# Patient Record
(demographics unavailable — no encounter records)

---

## 2024-10-17 NOTE — ASSESSMENT
[FreeTextEntry1] : 8 yo male with episodes of croup and cough, the latter with URIs. 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  I had discussed  the unified airway theory - has crouo and dx of asthma, mild intermittent.  No family history of asthma except in a maternal cousin. No concerns at this time re. sleep disorder breathing, persistent rhinitis or TE as confounders.  He has been on intermittent regimen of budesonide for URI-triggered cough/wheeze and with good response in addition to albuterol. Mom understands that frequent use of regimen will inform the decision re. need for daily use. He is well between episodes.  Mom prefers the nebulized route for medications as Chintan is autistic and does not do well with facial interfaces such as mask with the spacer.  He has prednisolone on standby as has been the practice and mom's request.  REcommendations: 1. Nebulized saline 2-3 times a day for nose and chest congestion. Also discussed mist,hot shower for croup. 2. Nebulized albuterol every 4 hours for cough or wheeze as needed. 3. When sick with a cold and with cough or wheeze, start nebulized budesonide 0.5 mg twice daily for about a week or until better then stop. We will keep track of use. 4. If with persistent stridor, barky cough and/or cough not responding to albuterol every 4 hours and going into 24 - 48 hours, start prednisolone 15 mg/5 ml, 7.5 ml BID x 5 days. 5. Follow up in the winter.

## 2024-10-17 NOTE — ASSESSMENT
[FreeTextEntry1] : 6 yo male with episodes of croup and cough, the latter with URIs. 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  I had discussed  the unified airway theory - has crouo and dx of asthma, mild intermittent.  No family history of asthma except in a maternal cousin. No concerns at this time re. sleep disorder breathing, persistent rhinitis or TE as confounders.  He has been on intermittent regimen of budesonide for URI-triggered cough/wheeze and with good response in addition to albuterol. Mom understands that frequent use of regimen will inform the decision re. need for daily use. He is well between episodes.  Mom prefers the nebulized route for medications as Chintan is autistic and does not do well with facial interfaces such as mask with the spacer.  He has prednisolone on standby as has been the practice and mom's request.  REcommendations: 1. Nebulized saline 2-3 times a day for nose and chest congestion. Also discussed mist,hot shower for croup. 2. Nebulized albuterol every 4 hours for cough or wheeze as needed. 3. When sick with a cold and with cough or wheeze, start nebulized budesonide 0.5 mg twice daily for about a week or until better then stop. We will keep track of use. 4. If with persistent stridor, barky cough and/or cough not responding to albuterol every 4 hours and going into 24 - 48 hours, start prednisolone 15 mg/5 ml, 7.5 ml BID x 5 days. 5. Follow up in the winter.

## 2024-10-17 NOTE — PHYSICAL EXAM
[Well Nourished] : well nourished [Well Developed] : well developed [Alert] : ~L alert [Active] : active [Normal Breathing Pattern] : normal breathing pattern [No Respiratory Distress] : no respiratory distress [No Allergic Shiners] : no allergic shiners [No Drainage] : no drainage [No Conjunctivitis] : no conjunctivitis [Tympanic Membranes Clear] : tympanic membranes were clear [Nasal Mucosa Non-Edematous] : nasal mucosa non-edematous [No Nasal Drainage] : no nasal drainage [No Oral Pallor] : no oral pallor [No Oral Cyanosis] : no oral cyanosis [Non-Erythematous] : non-erythematous [No Exudates] : no exudates [No Postnasal Drip] : no postnasal drip [No Tonsillar Enlargement] : no tonsillar enlargement [Absence Of Retractions] : absence of retractions [Symmetric] : symmetric [Good Expansion] : good expansion [No Acc Muscle Use] : no accessory muscle use [Good aeration to bases] : good aeration to bases [Equal Breath Sounds] : equal breath sounds bilaterally [No Crackles] : no crackles [No Rhonchi] : no rhonchi [No Wheezing] : no wheezing [Normal Sinus Rhythm] : normal sinus rhythm [No Heart Murmur] : no heart murmur [Soft, Non-Tender] : soft, non-tender [No Hepatosplenomegaly] : no hepatosplenomegaly [Non Distended] : was not ~L distended [Abdomen Mass (___ Cm)] : no abdominal mass palpated [Full ROM] : full range of motion [No Clubbing] : no clubbing [Capillary Refill < 2 secs] : capillary refill less than two seconds [No Cyanosis] : no cyanosis [No Petechiae] : no petechiae [No Kyphoscoliosis] : no kyphoscoliosis [No Contractures] : no contractures [Alert and  Oriented] : alert and oriented [No Abnormal Focal Findings] : no abnormal focal findings [Normal Muscle Tone And Reflexes] : normal muscle tone and reflexes [No Rashes] : no rashes [No Skin Lesions] : no skin lesions [FreeTextEntry1] : cooperative with exam

## 2024-10-17 NOTE — HISTORY OF PRESENT ILLNESS
[FreeTextEntry1] : INterval history: Nebulized medications not used at all since last visit.  He still prefers use of nebulizer over MDI/spacer. No snoring. No concerns re. nasal allergies.   last seen 2/22/24: 7 yo male with episodes of croup and cough, the latter with URIs. 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  I had discussed pre-school wheeze/cough and croup - within the perspective of upper (croup) airway reactivity and lower airway reactivity - the unified airway theory. His trajectory needs to be followed re. evolution towards an asthma diagnosis given risk factor of underlying allergy. No family history of asthma except in a maternal cousin. No concerns at this time re. sleep disorder breathing, persistent rhinitis or TE as confounders.  He has been on intermittent regimen of budesonide for URI-triggered cough/wheeze and with good response in addition to albuterol. He is well between episodes. He has had used the regimen twice since the last visit with good outcome and mom admits that she may "have prematurely provided prednisolone". Mom prefers the nebulized route for medications as Chintan is autistic and does not do well with facial interfaces such as mask with the spacer but willing to try to acclimatize him so to speak to using the spacer.. Lungs were clear to auscultation at today's visit.  I reviewed the nebulized medications and use of prednisolone, triggers of symptoms and demonstrated use of the aerochamber.  REcommendations: 1. Nebulized saline 2-3 times a day for nose and chest congestion. Also discussed mist,hot shower for croup. 2. Nebulized albuterol every 4 hours for cough or wheeze as needed. 3. When sick with a cold and with cough or wheeze, start nebulized budesonide 0.5 mg twice daily for about a week or until better then stop. We will keep track of use. 4. If with persistent stridor, barky cough and/or cough not responding to albuterol every 4 hours and going into 24 - 48 hours, start prednisolone 15 mg/5 ml, 7.5 ml BID x 5 days. 5. Follow up in the spring 2024.

## 2024-11-22 NOTE — DISCUSSION/SUMMARY
[FreeTextEntry8] : BEHAVIORAL OBSERVATIONS  -	Appearance: within normal limits -	Gross motor: within normal limits -	Fine motor: right handed. Pencil  is appropriate for age. -	Visual: within normal limits -	Hearing: within normal limits -	Receptive language: needs instructions repeated and rephrased into simpler language.  -	Expressive language: fluent with appropriate rate, volume. Prosody of speech somewhat atypical. Frequently returns to preferred topics in conversation. -	Social relatedness: some reciprocal conversation. Initiated and asked examiner questions. Some hesitation  from mom throughout the day, but benefited from breaks. -	Sensory processing: various repetitive behaviors (hand flapping and clapping, vocalizations/throat clearing, facial grimacing, some ear flicking).  -	Cooperation/effort/transitions: transitioning after breaks was difficult. Had difficulty with task persistence and benefitted from frequent breaks. Engaged in using a visual schedule to track progress, although this was at times a distractor. Asked for reassurance about upcoming activities. -	Attention: short attention span, often distractible (internally and externally). Able to be redirected. Benefitted from examiner ensuring his eye contact and attention before giving instructions.  -	Speed of performance: quick to respond. -	Mood/affect: affect is wide ranging. Somewhat tearful when he missed his mother.  -	Thought process: perseverates on ideas and topics  Overall, Chintan appeared to put forth good effort on all tasks, and results of this evaluation are considered a valid representation of Chintan's current neuropsychological status.   ASSESSMENT RESULTS  The results of Chintan's performance during this assessment are summarized below. Unless otherwise specified, performance is compared to same-age peers. All test scores and evaluation procedures are provided in an appendix at the end of this report.  Chintan showed particular areas of strength (above 90th percentile) in: -	Academic skills: o	Reading skills, including word reading and word decoding o	Math skills, including math calculation o	Writing skills, including spelling   Chintan showed age-appropriate skills (25th-75th percentile) in: -	Intellectual/reasoning abilities, including  o	Verbal comprehension skills (fund of vocabulary, understanding similarities among verbal concepts) o	Nonverbal reasoning (recognizing patterns among objects, quantitative reasoning)  Chintan showed relative areas of weaknesses (<25th percentile) or variable performance in:  -	Visual motor functioning, including  o	Fine motor speed and dexterity (manipulating small objects in fingers), which was variable, with exceptionally low performance using the right hand but average using the left.  o	Visual motor integration (hand-eye coordination)  o	Visual motor construction when recreating block designs based on pictures -	Processing speed, or speed of processing information and producing a response -	Attention and behavior regulation, including difficulties with inattention, sustaining attention and inhibiting from responding to distractions. On direct measures of attention regulation and inhibition, Chintan demonstrated difficulties with impulsivity, focusing attention, and maintaining vigilance. Throughout the testing day, distractibility affected his performance on some tasks, and he required multiple teaching trials and additional modifications to reduce distractions. On standardized parent report, subclinical symptoms were endorsed, including 0 of 9 symptoms of inattention that are part of diagnostic criteria for attention-deficit/hyperactivity disorder, and 4 of 9 symptoms of hyperactivity/impulsivity.  -	Adaptive functioning: Adaptive skills, or skills Chintan demonstrates in daily life, were overall below average for his age. Parent report indicated low average communication skills, and below average daily living, socialization, and motor skills.   Social/emotional/behavioral functioning  The assessment for the presence of symptoms of an autism spectrum disorder (ASD) was done by clinical interview with parent, standardized questionnaires completed by parent, and administration of a semi-structured, standardized interactive measure, the ADOS-2 (Module 3). The combination of these procedures assess the child's functioning in the areas of reciprocal social communication interaction, and repetitive/stereotyped behavior, which are the defining behavioral features of ASD. The results of these measures are combined with informed clinical judgment of the examiner in order to determine diagnosis. -	Affect recognition abilities when distinguishing among emotions based on pictures of facial expressions was age-appropriate. -	A standard parent report of social skills indicated concerns with social communication and repetitive, restrictive behaviors.  Observations on the ADOS-2 indicated a social and behavioral profile consistent with autism spectrum disorder: -	Social Reciprocity and Social Communication o	Social/Emotional Reciprocity: Chintan engaged in back-and-forth communication with the examiner, especially in response to direct cues and prompts (question-answer format), with few instances of sustained conversation. He also showed curiosity about the examiner's experiences (e.g. asking if the examiner was a doctor, asked the examiner about her mother). At times, there were gaps in Chintan's reciprocity where he got stuck on question and would return to it later even though the examiner had answered it already. Chintan also had difficulty telling a narrative and responding to the examiner's comments. In contrast, when the topic of conversation was about a preferred topic (Blaze and the Monster Machines), he was able to verbalize much more, although the conversation largely involved discussing AJ the . This was consistent with parent report that although his social reciprocity in conversations has improved, he still perseverates on preferred topics and asks questions that he knows the answer to already. o	Nonverbal communication and understanding of his and others' emotions: Chintan demonstrated difficulties understanding and effectively using nonverbal communication, including integrating eye contact with his speech and gestures, or recognizing emotions in characters in a book or play scheme. This is consistent with parent report that he has difficulty picking up subtler emotions of others outside the family.  o	Developing, Maintaining, and Understanding Relationships: Chintan demonstrated a limited understanding of relationships and friendships. While he expressed that friendship is a good thing, he was unable to elaborate further. He also demonstrated difficulties with understanding social annoyances and describing his relationships with other children.  -	Restricted, Repetitive Behaviors, Interests, and Sensory Sensitivities o	Repetitive Behaviors: Chintan demonstrated some idiosyncratic and repetitive behaviors during the assessment. For example, hand clapping and flapping, throat clearing/vocalizations, facial grimaces, and some flicking of the ears. On multiple occasions, he flapped or clapped his hands when he was excited during some tasks, especially the academic tasks (math, reading, and spelling).  o	Restricted Interests: Chintan often brought up Blaze and the Monster Machines, even when it was not relevant to the conversation topic. He was easily redirected back on target. However, when he became upset while discussing his emotions, he became fixated on missing his mother and he had difficulty being redirected back on target.  o	Inflexible Adherence to Routines, or Rituals: During the evaluation, Chintan was perfectionistic on some tasks when he did not like how the examiner mamta a slash and corrected it himself.  o	Sensory Sensitivities: Per his mother, Chintan is a picky eater especially about food textures. He also covers his ears when it gets too loud.  SUMMARY and IMPRESSIONS Chintan Meng is a 7 year, 0-month-old male with history of autism spectrum disorder with accompanying language impairment. Chintan was referred for neuropsychological evaluation by his mother to characterize his autism spectrum disorder presentation, and his cognitive and adaptive functioning to inform intervention and education recommendations. Chintan is in the 2nd grade with an IEP that was first established in pre-school. He is in a self-contained special education classroom. He has also received CHRIS therapy, speech language therapy services 2x/week, occupational therapy services 1x/week, and small-group reading supports. Based on psychoeducational evaluation from 2020, Rafiqs overall intellectual functioning was low average. Core language skills and adaptive functioning were also below age expectations. At the time, Chintan met diagnostic criteria for autism spectrum disorder, level 2 (requiring substantial support), with accompanying language impairment. The current evaluation assessed Chintan's cognitive, social, and adaptive functioning.  Results of this evaluation are largely consistent with results of his 2020 evaluation. Chintan's cognitive and behavioral profile continues to be consistent with a DSM-5 diagnosis of autism spectrum disorder (ASD), level 2 (requiring substantial support), without intellectual impairment. Further speech language testing is recommended to determine language skills. Per his mother's report, Chintan's social functioning has improved significantly over the past few years from his engagement in CHRIS therapy. However, he will benefit from continued CHRIS services as he continues to demonstrated weaknesses in social reciprocity, understanding of others' emotions, nonverbal communication, understanding of relationships, as well as presence of repetitive behaviors, restricted/fixated interests, behavioral rigidity, and sensory sensitivities.   At this evaluation, Rafiqs academic skills were an area of exceptional strength; he demonstrates exceptionally high reading, math, and spelling skills. His level of engagement visibly improved when the evaluation transitioned to academic skills, and he took pride in that fact that he reads and does math above his current grade level. Chintan also demonstrated age-appropriate overall intellectual functioning/reasoning skills. Relative weaknesses were seen in visual motor skills and processing speed, which can make certain tasks requiring hand-eye coordination and speed more difficult. Weaknesses in adaptive functioning, particularly daily living skills that require fine motor dexterity, may reflect these visual motor and processing speed difficulties. Continued support in building adaptive functioning skills is crucial. Chintan also demonstrated variable attention and behavior regulation skills that, at this time, is best understood in the context of his autism symptoms (e.g., perseverating on certain thoughts in his mind may make him more distractible). Given parent does not endorse significant symptoms of attention-deficit/hyperactivity disorder (ADHD), he does not meet criteria for a diagnosis of ADHD at this time. His attention skills should be closely monitored as co-occurring ASD and ADHD is common. It is also possible that because of his exceptional academic skills and restricted interests, Chintan is feeling underchallenged in areas outside of his interest, which can lead to attention and behavior dysregulation. Ensuring that his academic curriculum meets his academic abilities will be important to prevent boredom in school.   Chintan's unique cognitive and behavioral profile, including being on the autism spectrum, suggests that he may attend to his world differently than non-autistic individuals, and differences in what he gravitates toward can often conflict with what is expected of them from parents/teachers/peers. For example, Chintan can attend very well to details about topics that interest him (e.g. Blaze and the Monster Machines, academics), and learn about these topics at pace and complexity that many of his peers cannot. Exceptional skills in one area, also called "splinter skills," are sometimes seen in autistic children. In contrast, he may not as readily attend to social cues and "unsaid rules" within group settings that can help guide behavior. While Chintan appears socially motivated to make friends, he has more difficulty readily recognizing others' social cues, understand peer intentions and know how to effectively engage in social interaction. Continued support to build his "toolbox" of skills to navigate the world around him, while also celebrating his unique strengths and differences will be important.   Chintan is a bright, creative, and sweet child. We are optimistic that with the appropriate supports and interventions, he will continue to develop and thrive in his home, school, and community settings.   ICD-10 Diagnoses:  Autism spectrum disorder, level 2 requiring substantial support, without intellectual impairment [FreeTextEntry4] : Therapy/service recommendations  1.	Continue Applied Behavior Analysis (CHRIS) services. 2.	Continue with outpatient speech therapy services. 3.	Share this evaluation report with therapists to guide intervention planning. 4.	Chintan would benefit from services through The Office for People with Developmental Disabilities (OPWDD) due to his autism spectrum disorder.  a.	The "Front Door" process helps you get started on learning about OPWDD services and which services Chintan may qualify for: https://opwdd.[place]/get-started/front-door.  5.	Additional resources/service  a.	Chintan may be eligible for a "Medicaid Waiver" so that he can access Home and Community Based Services (HCBS) such as occupational therapy that is state funded even if he might not otherwise qualify based on parent income.  Learning more at: https://opwdd.[place]/getting-started/medicaid-and-opwdd-services   b.	SSI: Supplemental Security Income: http://www.ssa.gov/disabilityssi/ssi.html  6.	Chintan may benefit from social skills groups in the future. Some examples of providers of social skills groups/training include: a.	Program for the Education and Enrichment of Relational Skills (PEERS) is an evidence-based social skills programs for children, adolescents, and young adults who are interested in developing and maintaining close friendships.  i.	https://[employer]ell/ offers social skills training by PEERS trained providers in [place].  b.	https://www.[employer]/  also has social skills training.  c.	You may want to review The National Standards Project: http://www.nationalautismcenter.org/national-standards-project/phase-2/ for information about effective interventions for ASD. This project is parent-driven and informed by experts across medicine to provide as much of an unbiased review of treatments and the evidence supporting their effectiveness for children.  School recommendations 1.	Chintan should continue to qualify for an Individualized Education Program (IEP).  2.	He requires specialized instruction that uses Applied Behavior Analysis (CHRIS) services provided by trained staff (BCBA or BCBA supervised). Services should include parent training. 3.	Continue with instruction in a special education classroom with a small teacher to student ratio. 4.	Continue speech therapy services to improve functional social communication skills.  a.	In addition to individual speech language therapy services, Chintan would benefit from speech therapy delivered in a small group setting or a supported play group with one or two peers to further address pragmatic language abilities and encourage age-appropriate interaction and conversation skills with peers. 5.	Increase occupational therapy services (2-3x/week).   a.	Adaptive functioning goals should be included in Chintan's IEP, with the goal to teach Chintan skills necessary for being as independent as possible in the future (e.g., meal preparation, laundry, making small purchases).  b.	Address Chintan's difficulties with sensory integration, contributing to poorly modulated activity, impulsivity, and inattention. Provide tools that help Chintan regulate himself when he is seeking sensory stimulation or being overstimulated.   c.	Teach functional play skills. Model to Chintan how to interact with a variety of toys, including make-believe play and redirect repetitive play to more functional play schemes.  6.	Provide social skills training and counseling focusing on building emotional awareness, adaptive coping skills, social problem solving, opportunities to practice social skills in a structured and supported group setting. 7.	Support for Autistic Burnout Recovery: Autistic burnout has the following primary characteristics: "chronic exhaustion, loss of skills, and reduced tolerance of stimulus". Autistic individuals across all ages experience autistic burnout due to the consistent pressures such as navigating social situations, sensory overstimulation, and ongoing masking among neurotypical peers. If your child appears more tired or irritable right after school or after events with a lot of social or sensory demands, then your child may be experiencing autistic burnout. Suggestions to support autistic burnout recovery in school and home can include: a.	Allowing Chintan to have frequent breaks or downtime when social demands are reduced. b.	Allowing Chintan to engage in his preferred interests and/or repetitive behaviors. c.	For more information about autistic burnout, please read: Antidotes to Autistic Burnout, Having All of Your Internal Resources Exhausted Beyond Measure and Being Left with No Clean-Up Crew, and Autistic Burnout, Explained  Home recommendations 1.	Extracurricular activities for children on the autism spectrum: Continue enrolling Chintan in activities that allow opportunities for socialization in a guided environment as well as helping him stay active.  a.	Adaptive martial arts program: http://www.LUMOback.org/kicking-the-spectrum/ b.	Swimming: https://[facility].org/adaptive-aquatics c.	Music therapy: http://www.myqlfrtqjfnu9upaofknf.org/music-therapy.htm d.	Additional lists: 	https://[place]cautismcommunity.org/autism-organizations-and-providers/  	https://www.OneEyeAnt.Synerscope/  	https://www.PredictAd/lists/fun-for-families-with-special-needs-around-[facility]  	https://Flocasts/family/[facility]-special-needs-guide  2.	To support behavior management: aGarett Woodson's activity level can make it difficult to be sure he is attending to verbal instruction. Adults interacting with Chnitan should obtain eye contact and provide verbal instruction using clear, simple language. Multi-step instructions should be broken into smaller steps to ensure his success.  b.	In order to increase his ability to pay attention to activities, Chintan will benefit most from a learning environment in which distractions are very low initially (for example, quiet, clean, organized) and then made increasingly complex as skills are systematically generalized to more natural conditions.    c.	Chintan would benefit from an individualized behavior and motivation system. This should include a frequent schedule of reinforcement, including primary and social reinforcers, and a behavior plan that will increase desired behavior and decrease maladaptive behavior (for example, increase attention to tasks and decrease crying when asked to complete a non-preferred activity).  Reinforcement should be given across all environments (programming, home).    d.	Chintan also engaged in self-stimulating behaviors during the evaluation. To help distract him from these behaviors, Chintan should be engaged in more interactive play and routines with his therapists and parents, once these routines have been developed. Distraction can take the form of playful exchanges during dressing, bathing, eating, and playing. For instance, if Chintan is repeatedly building and breaking down a block tower, an adult could playfully put his hand over the next toy and instruct Chintan to add in a character to the play, and pretend a monster is knocking down the block tower.    3.	Emotion Identification & Regulation: a.	Proactively monitor Chintan for signs of overload and encourage him to use a coping strategy (e.g., breathing, taking a break) before he becomes overloaded. It will also be helpful to develop a system for Chintan to self-monitor feelings/overload, such as a feelings thermometer or other visual system. Include self-advocacy and coping skills as part of this system, so that he knows what to do when he reaches different levels on the thermometer.   b.	Continue supporting Chintan in building adaptive emotion identification and regulation skills: o	Using the "Zones of Regulation" (e.g., noting when he is in the "green," "yellow," or "red" zone for when he is feeling happy/calm, frustrated, or angry, respectively) can be a helpful way for Chintan to learn how to notice his emotions and communicate to others how he is feeling.  o	Continue encouraging Chintan to use words to tell others how he is feeling. Adults can model this by regularly using emotional vocabulary throughout their day ("I'm feeling mad/sad/happy/scared/etc.) Praise Chintan for any attempts to use his words to show others how he is feeling. o	Teach Chintan simple calming strategies such as breathing for when he is feeling nervous or upset. One way to teach breathing is to have Chintan lie down on his back and put a stuffed animal/toy on his belly. Have his breathe in and move the stuffed animal up, then breathe out and bring the stuffed animal back down. 4.	Additional resources: a.	Additional resources: https://www.research.[facility]/car-autism-roadmap.    b.	Parent to Parent of [place]: Parent to Parent [facility] builds a supportive network of families to reduce isolation and empower those who care for people with developmental disabilities or special healthcare needs to navigate and influence service systems and make informed decisions. https://www.ptopnys.org/  c.	My autism book: A child's guide to their autism spectrum diagnosis by Tamia Jara and Krystal Goetz provides a first-hand account of how a parent can explain an autism diagnosis to their child in a sensitive, positive and accurate way. jericho.	[employer].org includes tips and articles about special education law and the IEP process.  Continued monitoring/Follow-up with Neuropsychology Chintan's school should continue to closely monitor his academic, social, adaptive, and emotional development. The family is welcome to return for additional neuropsychological consultation or evaluation should they have additional questions about cognitive/social development in the future.  It was a pleasure working with Chintan and his family. Please reach out with any questions at [phone] or email [email or web address] (attention Dr. Jenny Tobias).

## 2024-11-22 NOTE — HISTORY OF PRESENT ILLNESS
[FreeTextEntry1] : CURRENT CONCERNS Chintan's mother reported concerns about the following: -	Social difficulties:  o	Social reciprocity and social emotional understanding: Parent reports that his conversation skills have improved, although he needs to work on the variety of conversation. He can get stuck on certain topics at times (e.g. [employer] and ask his mother the same questions even if he knows the answer). He can join in others' positive emotions (e.g. will cheer if others are cheering at a game). However, he does not empathize with others' negative emotions (he starts laughing when others are crying). He does notice when his mother is mad. He has been learning to take turns in [employer] ([employer]). He does not respond to others' social overtures and often needs prompting from his mother. When prompted, his responses are brief.  o	Nonverbal communication: He does not  on subtle emotions from others, however he will respond to changes in tone of voice. He does not engage in joint attention. His eye contact is getting better, but still needs improvement (when he greets people, he will verbalize but not look at them). He sometimes uses gestures to accompany language. He points to things that he wants.  o	Interpersonal Relationships: He has a few friends and engages in play dates. He is socially interested and wants to be around classmates. He prefers to play alongside or near friends, but not cooperatively with them. His play is often self-directed.  -	Restricted and repetitive behaviors and interests:  o	Repetitive and self-stimulatory behaviors: He stims (claps and flaps his hands). He started grimacing his face repeatedly since the start of this school year. He also fixates on sticking his finger in his mouth, although he does not ingest objects. He repeatedly jumps. He does not sit still and is constantly moving. He has a 1:1 paraprofessional at school for hyperactivity. He repeatedly plays with the same toys. He repeats some phrases. He does not engage in scripted speech or idiosyncratic language. o	Restricted Interests: He fixates on the same toys and certain topics. Per his mother, he is playing basketball now and understanding more about team sports.  o	Rituals and Routines: He has been working actively in [employer] on being more flexible and open-minded. He can get upset with last minute changes. He notices when there are changes to routine (e.g., taking a detour route).  -	Sensory sensitivities: If it is too loud he will cover his ears. Oral sensory seeking behaviors as described above. Very picky eater. He was going to food therapy to address food aversions due to texture. Does not like certain smells of food.   RELEVANT HISTORY  Birth/Developmental:  -	Pregnancy - no complications -	Birth/delivery - born full term, no complications -	Medical history is significant for Croup syndrome and Reactive airway disease -	Developmental milestones -  o	Gross motor - he started walking at 16 months. o	Language milestones delayed. He babbled at 2 months old. He has been receiving speech therapy services since he was 3 years old. He communicates using short phrases and is able to follow simple instructions. o	He is toilet trained. -	He has been getting consistent intervention services (speech, occupational therapy/OT, physical therapy/PT, and special instruction) since he was 3 years old.  Medical:  -	Medical history is significant for Croup syndrome and Reactive airway disease -	Appetite - picky eater but has adequate nutritional intake  -	No vision, hearing, pain concerns -	Medications: AeroChamber Plus Ascencion-Vu Medium, Albuterol Sulfate (2.5 MG/3ML) 0.083% Inhalation Nebulization Solution, Budesonide 0.5 MG/2ML Inhalation Suspension, prednisoLONE 15 MG/5ML Oral Solution, Sodium Chloride 0.9 % Inhalation Nebulization Solution Emotional / Behavioral / Social:  -	Receives school counseling and [facility]RIS therapy. Social skills training has been recommended in the past.  Educational: In the 2nd grade with an Individualized Education Program (IEP). IEP was first established in pre-school. Instructed in self-contained special education classroom. He has received speech language therapy services 2x/week, OT services 1x/week, and small-group reading supports. Grades are improving. Enjoys writing. Able to complete single digit addition and subtraction.   Family: Lives at home with his mother, father, and younger brother (age 4 years). English is the primary language spoken at home. Family history is significant for postpartum depression and anxiety in the immediate family.   Prior Cognitive Testing:  -	Psychoeducational evaluation 2020, [facility] WHEAT CPSE) o	Evaluation for autism spectrum disorder was based on direct observations, review of educational records, standardized testing measures (ADOS-2 and CARS-2), and parent interview. Chintan met diagnostic criteria for autism spectrum disorder, level 2 (requiring substantial support), with accompanying language impairment. Examiners also noted the following: ?	Speech-language delays including articulation delay, poor speech intelligibility, and limited expressive vocabulary ?	Cognitive skills were in the low average range ?	Fine motor and sensory processing delays o	WPPSI-IV overall cognitive abilities were low average (SS=87). o	Core language skills on the CELF-5 were below average (SS = 73) with better receptive language than expressive skills. o	Overall adaptive functioning on the [place]-3 was below average (SS = 74), with exceptionally low socialization skills (SS=66).

## 2024-11-22 NOTE — REASON FOR VISIT
[Patient preference] : as per patient preference [Continuing, patient seen in-person within last 12 months] : Telehealth services are continuing as patient has been seen in-person within last 12 months. [Telehealth (audio & video) - Individual/Group] : This visit was provided via telehealth using real-time 2-way audio visual technology. [Medical Office: ([facility])___] : The provider was located at the medical office in [unfilled]. [Home] : The patient, [unfilled], was located at home, [unfilled], at the time of the visit. [Patient's space is appropriate for telehealth and maintains privacy/confidentiality.] : Patient's space is appropriate for telehealth and maintains privacy/confidentiality. [Participant(s) identity verified] : Participant(s) identity verified. [Verbal consent obtained from patient/other participant(s)] : Verbal consent for telehealth/telephonic services obtained from patient/other participant(s) [Feedback of results of neuropsychological evaluation] : Feedback of results of neuropsychological evaluation [Collateral without patient] : Collateral without patient [Mother] : mother [FreeTextEntry4] : 3:30pm [FreeTextEntry5] : 4:30pm [FreeTextEntry1] : Chintan Meng is a 7 year, 0-month-old male with history of autism spectrum disorder with accompanying language impairment. Chintan was referred for neuropsychological evaluation by his mother to characterize his autism spectrum disorder presentation, and his cognitive and adaptive functioning to inform intervention and education recommendations.

## 2024-11-22 NOTE — REASON FOR VISIT
[Patient preference] : as per patient preference [Continuing, patient seen in-person within last 12 months] : Telehealth services are continuing as patient has been seen in-person within last 12 months. [Telehealth (audio & video) - Individual/Group] : This visit was provided via telehealth using real-time 2-way audio visual technology. [Medical Office: ([facility])___] : The provider was located at the medical office in [unfilled]. [Home] : The patient, [unfilled], was located at home, [unfilled], at the time of the visit. [Patient's space is appropriate for telehealth and maintains privacy/confidentiality.] : Patient's space is appropriate for telehealth and maintains privacy/confidentiality. [Participant(s) identity verified] : Participant(s) identity verified. [Verbal consent obtained from patient/other participant(s)] : Verbal consent for telehealth/telephonic services obtained from patient/other participant(s) [Feedback of results of neuropsychological evaluation] : Feedback of results of neuropsychological evaluation [Collateral without patient] : Collateral without patient [Mother] : mother [FreeTextEntry4] : 3:30pm [FreeTextEntry5] : 4:30pm [FreeTextEntry1] : Chintan Meng is a 7 year, 0-month-old male with history of autism spectrum disorder with accompanying language impairment. Chintan was referred for neuropsychological evaluation by his mother to characterize his autism spectrum disorder presentation, and his cognitive and adaptive functioning to inform intervention and education recommendations.

## 2024-11-22 NOTE — DISCUSSION/SUMMARY
[FreeTextEntry8] : BEHAVIORAL OBSERVATIONS  -	Appearance: within normal limits -	Gross motor: within normal limits -	Fine motor: right handed. Pencil  is appropriate for age. -	Visual: within normal limits -	Hearing: within normal limits -	Receptive language: needs instructions repeated and rephrased into simpler language.  -	Expressive language: fluent with appropriate rate, volume. Prosody of speech somewhat atypical. Frequently returns to preferred topics in conversation. -	Social relatedness: some reciprocal conversation. Initiated and asked examiner questions. Some hesitation  from mom throughout the day, but benefited from breaks. -	Sensory processing: various repetitive behaviors (hand flapping and clapping, vocalizations/throat clearing, facial grimacing, some ear flicking).  -	Cooperation/effort/transitions: transitioning after breaks was difficult. Had difficulty with task persistence and benefitted from frequent breaks. Engaged in using a visual schedule to track progress, although this was at times a distractor. Asked for reassurance about upcoming activities. -	Attention: short attention span, often distractible (internally and externally). Able to be redirected. Benefitted from examiner ensuring his eye contact and attention before giving instructions.  -	Speed of performance: quick to respond. -	Mood/affect: affect is wide ranging. Somewhat tearful when he missed his mother.  -	Thought process: perseverates on ideas and topics  Overall, Chintan appeared to put forth good effort on all tasks, and results of this evaluation are considered a valid representation of Chintan's current neuropsychological status.   ASSESSMENT RESULTS  The results of Chintan's performance during this assessment are summarized below. Unless otherwise specified, performance is compared to same-age peers. All test scores and evaluation procedures are provided in an appendix at the end of this report.  Chintan showed particular areas of strength (above 90th percentile) in: -	Academic skills: o	Reading skills, including word reading and word decoding o	Math skills, including math calculation o	Writing skills, including spelling   Chintan showed age-appropriate skills (25th-75th percentile) in: -	Intellectual/reasoning abilities, including  o	Verbal comprehension skills (fund of vocabulary, understanding similarities among verbal concepts) o	Nonverbal reasoning (recognizing patterns among objects, quantitative reasoning)  Chintan showed relative areas of weaknesses (<25th percentile) or variable performance in:  -	Visual motor functioning, including  o	Fine motor speed and dexterity (manipulating small objects in fingers), which was variable, with exceptionally low performance using the right hand but average using the left.  o	Visual motor integration (hand-eye coordination)  o	Visual motor construction when recreating block designs based on pictures -	Processing speed, or speed of processing information and producing a response -	Attention and behavior regulation, including difficulties with inattention, sustaining attention and inhibiting from responding to distractions. On direct measures of attention regulation and inhibition, Chintan demonstrated difficulties with impulsivity, focusing attention, and maintaining vigilance. Throughout the testing day, distractibility affected his performance on some tasks, and he required multiple teaching trials and additional modifications to reduce distractions. On standardized parent report, subclinical symptoms were endorsed, including 0 of 9 symptoms of inattention that are part of diagnostic criteria for attention-deficit/hyperactivity disorder, and 4 of 9 symptoms of hyperactivity/impulsivity.  -	Adaptive functioning: Adaptive skills, or skills Chintan demonstrates in daily life, were overall below average for his age. Parent report indicated low average communication skills, and below average daily living, socialization, and motor skills.   Social/emotional/behavioral functioning  The assessment for the presence of symptoms of an autism spectrum disorder (ASD) was done by clinical interview with parent, standardized questionnaires completed by parent, and administration of a semi-structured, standardized interactive measure, the ADOS-2 (Module 3). The combination of these procedures assess the child's functioning in the areas of reciprocal social communication interaction, and repetitive/stereotyped behavior, which are the defining behavioral features of ASD. The results of these measures are combined with informed clinical judgment of the examiner in order to determine diagnosis. -	Affect recognition abilities when distinguishing among emotions based on pictures of facial expressions was age-appropriate. -	A standard parent report of social skills indicated concerns with social communication and repetitive, restrictive behaviors.  Observations on the ADOS-2 indicated a social and behavioral profile consistent with autism spectrum disorder: -	Social Reciprocity and Social Communication o	Social/Emotional Reciprocity: Chintan engaged in back-and-forth communication with the examiner, especially in response to direct cues and prompts (question-answer format), with few instances of sustained conversation. He also showed curiosity about the examiner's experiences (e.g. asking if the examiner was a doctor, asked the examiner about her mother). At times, there were gaps in Chintan's reciprocity where he got stuck on question and would return to it later even though the examiner had answered it already. Chintan also had difficulty telling a narrative and responding to the examiner's comments. In contrast, when the topic of conversation was about a preferred topic ([employer] the Monster Machines), he was able to verbalize much more, although the conversation largely involved discussing AJ the . This was consistent with parent report that although his social reciprocity in conversations has improved, he still perseverates on preferred topics and asks questions that he knows the answer to already. o	Nonverbal communication and understanding of his and others' emotions: Chintan demonstrated difficulties understanding and effectively using nonverbal communication, including integrating eye contact with his speech and gestures, or recognizing emotions in characters in a book or play scheme. This is consistent with parent report that he has difficulty picking up subtler emotions of others outside the family.  o	Developing, Maintaining, and Understanding Relationships: Chintan demonstrated a limited understanding of relationships and friendships. While he expressed that friendship is a good thing, he was unable to elaborate further. He also demonstrated difficulties with understanding social annoyances and describing his relationships with other children.  -	Restricted, Repetitive Behaviors, Interests, and Sensory Sensitivities o	Repetitive Behaviors: Chintan demonstrated some idiosyncratic and repetitive behaviors during the assessment. For example, hand clapping and flapping, throat clearing/vocalizations, facial grimaces, and some flicking of the ears. On multiple occasions, he flapped or clapped his hands when he was excited during some tasks, especially the academic tasks (math, reading, and spelling).  o	Restricted Interests: Chintan often brought up [employer], even when it was not relevant to the conversation topic. He was easily redirected back on target. However, when he became upset while discussing his emotions, he became fixated on missing his mother and he had difficulty being redirected back on target.  o	Inflexible Adherence to Routines, or Rituals: During the evaluation, Chintan was perfectionistic on some tasks when he did not like how the examiner mamta a slash and corrected it himself.  o	Sensory Sensitivities: Per his mother, Chintan is a picky eater especially about food textures. He also covers his ears when it gets too loud.  SUMMARY and IMPRESSIONS Chintan Meng is a 7 year, 0-month-old male with history of autism spectrum disorder with accompanying language impairment. Chintan was referred for neuropsychological evaluation by his mother to characterize his autism spectrum disorder presentation, and his cognitive and adaptive functioning to inform intervention and education recommendations. Chintan is in the 2nd grade with an IEP that was first established in pre-school. He is in a self-contained special education classroom. He has also received CHRIS therapy, speech language therapy services 2x/week, occupational therapy services 1x/week, and small-group reading supports. Based on psychoeducational evaluation from 2020, Rafiqs overall intellectual functioning was low average. Core language skills and adaptive functioning were also below age expectations. At the time, Chintan met diagnostic criteria for autism spectrum disorder, level 2 (requiring substantial support), with accompanying language impairment. The current evaluation assessed Chintan's cognitive, social, and adaptive functioning.  Results of this evaluation are largely consistent with results of his 2020 evaluation. Chintan's cognitive and behavioral profile continues to be consistent with a DSM-5 diagnosis of autism spectrum disorder (ASD), level 2 (requiring substantial support), without intellectual impairment. Further speech language testing is recommended to determine language skills. Per his mother's report, Chintan's social functioning has improved significantly over the past few years from his engagement in CHRIS therapy. However, he will benefit from continued CHRIS services as he continues to demonstrated weaknesses in social reciprocity, understanding of others' emotions, nonverbal communication, understanding of relationships, as well as presence of repetitive behaviors, restricted/fixated interests, behavioral rigidity, and sensory sensitivities.   At this evaluation, Rafiqs academic skills were an area of exceptional strength; he demonstrates exceptionally high reading, math, and spelling skills. His level of engagement visibly improved when the evaluation transitioned to academic skills, and he took pride in that fact that he reads and does math above his current grade level. Chintan also demonstrated age-appropriate overall intellectual functioning/reasoning skills. Relative weaknesses were seen in visual motor skills and processing speed, which can make certain tasks requiring hand-eye coordination and speed more difficult. Weaknesses in adaptive functioning, particularly daily living skills that require fine motor dexterity, may reflect these visual motor and processing speed difficulties. Continued support in building adaptive functioning skills is crucial. Chintan also demonstrated variable attention and behavior regulation skills that, at this time, is best understood in the context of his autism symptoms (e.g., perseverating on certain thoughts in his mind may make him more distractible). Given parent does not endorse significant symptoms of attention-deficit/hyperactivity disorder (ADHD), he does not meet criteria for a diagnosis of ADHD at this time. His attention skills should be closely monitored as co-occurring ASD and ADHD is common. It is also possible that because of his exceptional academic skills and restricted interests, Chintan is feeling underchallenged in areas outside of his interest, which can lead to attention and behavior dysregulation. Ensuring that his academic curriculum meets his academic abilities will be important to prevent boredom in school.   Chintan's unique cognitive and behavioral profile, including being on the autism spectrum, suggests that he may attend to his world differently than non-autistic individuals, and differences in what he gravitates toward can often conflict with what is expected of them from parents/teachers/peers. For example, Chintan can attend very well to details about topics that interest him (e.g. Blaze [employer] the Monster Machines, academics), and learn about these topics at pace and complexity that many of his peers cannot. Exceptional skills in one area, also called "splinter skills," are sometimes seen in autistic children. In contrast, he may not as readily attend to social cues and "unsaid rules" within group settings that can help guide behavior. While Chintan appears socially motivated to make friends, he has more difficulty readily recognizing others' social cues, understand peer intentions and know how to effectively engage in social interaction. Continued support to build his "toolbox" of skills to navigate the world around him, while also celebrating his unique strengths and differences will be important.   Chintan is a bright, creative, and sweet child. We are optimistic that with the appropriate supports and interventions, he will continue to develop and thrive in his home, school, and community settings.   ICD-10 Diagnoses:  Autism spectrum disorder, level 2 requiring substantial support, without intellectual impairment [FreeTextEntry4] : Therapy/service recommendations  1.	Continue Applied Behavior Analysis (CHRIS) services. 2.	Continue with outpatient speech therapy services. 3.	Share this evaluation report with therapists to guide intervention planning. 4.	Chintan would benefit from services through The Office for People with Developmental Disabilities (OPWDD) due to his autism spectrum disorder.  a.	The "Front Door" process helps you get started on learning about OPWDD services and which services Chintan may qualify for: https://opwdd.[place].gov/get-started/front-door.  5.	Additional resources/service  a.	Chintan may be eligible for a "Medicaid Waiver" so that he can access Home and Community Based Services (HCBS) such as occupational therapy that is state funded even if he might not otherwise qualify based on parent income.  Learning more at: https://opwdd.[place]/getting-started/medicaid-and-opwdd-services   b.	SSI: Supplemental Security Income: http://www.ssa.gov/disabilityssi/ssi.html  6.	Chintan may benefit from social skills groups in the future. Some examples of providers of social skills groups/training include: a.	Program for the Education and Enrichment of Relational Skills (PEERS) is an evidence-based social skills programs for children, adolescents, and young adults who are interested in developing and maintaining close friendships.  i.	https://Blast[employer]s/ offers social skills training by PEERS trained providers in [place].  b.	https://www.MoVoxx/  also has social skills training.  c.	You may want to review The National Standards Project: http://www.nationalautismcenter.org/national-standards-project/phase-2/ for information about effective interventions for ASD. This project is parent-driven and informed by experts across medicine to provide as much of an unbiased review of treatments and the evidence supporting their effectiveness for children.  School recommendations 1.	Chintan should continue to qualify for an Individualized Education Program (IEP).  2.	He requires specialized instruction that uses Applied Behavior Analysis (CHRIS) services provided by trained staff (BCBA or BCBA supervised). Services should include parent training. 3.	Continue with instruction in a special education classroom with a small teacher to student ratio. 4.	Continue speech therapy services to improve functional social communication skills.  a.	In addition to individual speech language therapy services, Chintan would benefit from speech therapy delivered in a small group setting or a supported play group with one or two peers to further address pragmatic language abilities and encourage age-appropriate interaction and conversation skills with peers. 5.	Increase occupational therapy services (2-3x/week).   a.	Adaptive functioning goals should be included in Chintan's IEP, with the goal to teach Chintan skills necessary for being as independent as possible in the future (e.g., meal preparation, laundry, making small purchases).  b.	Address Chintan's difficulties with sensory integration, contributing to poorly modulated activity, impulsivity, and inattention. Provide tools that help Chintan regulate himself when he is seeking sensory stimulation or being overstimulated.   c.	Teach functional play skills. Model to Chintan how to interact with a variety of toys, including make-believe play and redirect repetitive play to more functional play schemes.  6.	Provide social skills training and counseling focusing on building emotional awareness, adaptive coping skills, social problem solving, opportunities to practice social skills in a structured and supported group setting. 7.	Support for Autistic Burnout Recovery: Autistic burnout has the following primary characteristics: "chronic exhaustion, loss of skills, and reduced tolerance of stimulus". Autistic individuals across all ages experience autistic burnout due to the consistent pressures such as navigating social situations, sensory overstimulation, and ongoing masking among neurotypical peers. If your child appears more tired or irritable right after school or after events with a lot of social or sensory demands, then your child may be experiencing autistic burnout. Suggestions to support autistic burnout recovery in school and home can include: a.	Allowing Chintan to have frequent breaks or downtime when social demands are reduced. b.	Allowing Chintan to engage in his preferred interests and/or repetitive behaviors. c.	For more information about autistic burnout, please read: Antidotes to Autistic Burnout, Having All of Your Internal Resources Exhausted Beyond Measure and Being Left with No Clean-Up Crew, and Autistic Burnout, Explained  Home recommendations 1.	Extracurricular activities for children on the autism spectrum: Continue enrolling Chintan in activities that allow opportunities for socialization in a guided environment as well as helping him stay active.  a.	Adaptive martial arts program: http://www.[employer].org/kicking-the-spectrum/ b.	Swimming: https://[facility].org/adaptive-aquatics c.	Music therapy: http://www.ieclypgfkroj5zrumgjmr.org/music-therapy.htm d.	Additional lists: 	https://[place]cautismcommunity.org/autism-organizations-and-providers/  	https://www.[employer]/  	https://www.[employer]Traffic/lists/fun-for-families-with-special-needs-around-[facility]  	https://[employer]/family/[place]-special-needs-guide  2.	To support behavior management: aGarett Woodson's activity level can make it difficult to be sure he is attending to verbal instruction. Adults interacting with Chintan should obtain eye contact and provide verbal instruction using clear, simple language. Multi-step instructions should be broken into smaller steps to ensure his success.  b.	In order to increase his ability to pay attention to activities, Chintan will benefit most from a learning environment in which distractions are very low initially (for example, quiet, clean, organized) and then made increasingly complex as skills are systematically generalized to more natural conditions.    c.	Chintan would benefit from an individualized behavior and motivation system. This should include a frequent schedule of reinforcement, including primary and social reinforcers, and a behavior plan that will increase desired behavior and decrease maladaptive behavior (for example, increase attention to tasks and decrease crying when asked to complete a non-preferred activity).  Reinforcement should be given across all environments (programming, home).    d.	Chintan also engaged in self-stimulating behaviors during the evaluation. To help distract him from these behaviors, Chintan should be engaged in more interactive play and routines with his therapists and parents, once these routines have been developed. Distraction can take the form of playful exchanges during dressing, bathing, eating, and playing. For instance, if Chintan is repeatedly building and breaking down a block tower, an adult could playfully put his hand over the next toy and instruct Chintan to add in a character to the play, and pretend a monster is knocking down the block tower.    3.	Emotion Identification & Regulation: a.	Proactively monitor Chintan for signs of overload and encourage him to use a coping strategy (e.g., breathing, taking a break) before he becomes overloaded. It will also be helpful to develop a system for Chintan to self-monitor feelings/overload, such as a feelings thermometer or other visual system. Include self-advocacy and coping skills as part of this system, so that he knows what to do when he reaches different levels on the thermometer.   b.	Continue supporting Chintan in building adaptive emotion identification and regulation skills: o	Using the "Zones of Regulation" (e.g., noting when he is in the "green," "yellow," or "red" zone for when he is feeling happy/calm, frustrated, or angry, respectively) can be a helpful way for Chintan to learn how to notice his emotions and communicate to others how he is feeling.  o	Continue encouraging Chintan to use words to tell others how he is feeling. Adults can model this by regularly using emotional vocabulary throughout their day ("I'm feeling mad/sad/happy/scared/etc.) Praise Chintan for any attempts to use his words to show others how he is feeling. o	Teach Chintan simple calming strategies such as breathing for when he is feeling nervous or upset. One way to teach breathing is to have Chintan lie down on his back and put a stuffed animal/toy on his belly. Have his breathe in and move the stuffed animal up, then breathe out and bring the stuffed animal back down. 4.	Additional resources: a.	Additional resources: https://www.research.[facility]/car-autism-roadmap.    b.	Parent to Parent of [place]: Parent to Parent [facility] builds a supportive network of families to reduce isolation and empower those who care for people with developmental disabilities or special healthcare needs to navigate and influence service systems and make informed decisions. https://www.ptopnys.org/  c.	My autism book: A child's guide to their autism spectrum diagnosis by Tamia Jara and Krystal Goetz provides a first-hand account of how a parent can explain an autism diagnosis to their child in a sensitive, positive and accurate way. jericho.	[employer].org includes tips and articles about special education law and the IEP process.  Continued monitoring/Follow-up with Neuropsychology Chintan's school should continue to closely monitor his academic, social, adaptive, and emotional development. The family is welcome to return for additional neuropsychological consultation or evaluation should they have additional questions about cognitive/social development in the future.  It was a pleasure working with Chintan and his family. Please reach out with any questions at [phone] or email [email or web address] (attention Dr. Jenny Tobias).

## 2024-11-22 NOTE — HISTORY OF PRESENT ILLNESS
[FreeTextEntry1] : CURRENT CONCERNS Chintan's mother reported concerns about the following: -	Social difficulties:  o	Social reciprocity and social emotional understanding: Parent reports that his conversation skills have improved, although he needs to work on the variety of conversation. He can get stuck on certain topics at times (e.g. [employer] and ask his mother the same questions even if he knows the answer). He can join in others' positive emotions (e.g. will cheer if others are cheering at a game). However, he does not empathize with others' negative emotions (he starts laughing when others are crying). He does notice when his mother is mad. He has been learning to take turns in Applied Behavior Analysis (CHRIS). He does not respond to others' social overtures and often needs prompting from his mother. When prompted, his responses are brief.  o	Nonverbal communication: He does not  on subtle emotions from others, however he will respond to changes in tone of voice. He does not engage in joint attention. His eye contact is getting better, but still needs improvement (when he greets people, he will verbalize but not look at them). He sometimes uses gestures to accompany language. He points to things that he wants.  o	Interpersonal Relationships: He has a few friends and engages in play dates. He is socially interested and wants to be around classmates. He prefers to play alongside or near friends, but not cooperatively with them. His play is often self-directed.  -	Restricted and repetitive behaviors and interests:  o	Repetitive and self-stimulatory behaviors: He stims (claps and flaps his hands). He started grimacing his face repeatedly since the start of this school year. He also fixates on sticking his finger in his mouth, although he does not ingest objects. He repeatedly jumps. He does not sit still and is constantly moving. He has a 1:1 paraprofessional at school for hyperactivity. He repeatedly plays with the same toys. He repeats some phrases. He does not engage in scripted speech or idiosyncratic language. o	Restricted Interests: He fixates on the same toys and certain topics. Per his mother, he is playing basketball now and understanding more about team sports.  o	Rituals and Routines: He has been working actively in CHRIS on being more flexible and open-minded. He can get upset with last minute changes. He notices when there are changes to routine (e.g., taking a detour route).  -	Sensory sensitivities: If it is too loud he will cover his ears. Oral sensory seeking behaviors as described above. Very picky eater. He was going to food therapy to address food aversions due to texture. Does not like certain smells of food.   RELEVANT HISTORY  Birth/Developmental:  -	Pregnancy - no complications -	Birth/delivery - born full term, no complications -	Medical history is significant for Croup syndrome and Reactive airway disease -	Developmental milestones -  o	Gross motor - he started walking at 16 months. o	Language milestones delayed. He babbled at 2 months old. He has been receiving speech therapy services since he was 3 years old. He communicates using short phrases and is able to follow simple instructions. o	He is toilet trained. -	He has been getting consistent intervention services (speech, occupational therapy/OT, physical therapy/PT, and special instruction) since he was 3 years old.  Medical:  -	Medical history is significant for Croup syndrome and Reactive airway disease -	Appetite - picky eater but has adequate nutritional intake  -	No vision, hearing, pain concerns -	Medications: AeroChamber Plus Ascencion-Vu Medium, Albuterol Sulfate (2.5 MG/3ML) 0.083% Inhalation Nebulization Solution, Budesonide 0.5 MG/2ML Inhalation Suspension, prednisoLONE 15 MG/5ML Oral Solution, Sodium Chloride 0.9 % Inhalation Nebulization Solution Emotional / Behavioral / Social:  -	Receives school counseling and CHRIS therapy. Social skills training has been recommended in the past.  Educational: In the 2nd grade with an Individualized Education Program (IEP). IEP was first established in pre-school. Instructed in self-contained special education classroom. He has received speech language therapy services 2x/week, OT services 1x/week, and small-group reading supports. Grades are improving. Enjoys writing. Able to complete single digit addition and subtraction.   Family: Lives at home with his mother, father, and younger brother (age 4 years). English is the primary language spoken at home. Family history is significant for postpartum depression and anxiety in the immediate family.   Prior Cognitive Testing:  -	Psychoeducational evaluation 2020, [facility]) o	Evaluation for autism spectrum disorder was based on direct observations, review of educational records, standardized testing measures (ADOS-2 and CARS-2), and parent interview. Chintan met diagnostic criteria for autism spectrum disorder, level 2 (requiring substantial support), with accompanying language impairment. Examiners also noted the following: ?	Speech-language delays including articulation delay, poor speech intelligibility, and limited expressive vocabulary ?	Cognitive skills were in the low average range ?	Fine motor and sensory processing delays o	WPPSI-IV overall cognitive abilities were low average (SS=87). o	Core language skills on the CELF-5 were below average (SS = 73) with better receptive language than expressive skills. o	Overall adaptive functioning on the [place]-3 was below average (SS = 74), with exceptionally low socialization skills (SS=66).

## 2024-11-22 NOTE — ADDENDUM
[FreeTextEntry1] : APPENDIX OF SCORES AND PROCEDURES PROCEDURES ADMINISTERED -	Clinical Interview with patient and his mother -	Review of available medical and educational records ([facility] electronic medical record, IEP dated 05/31/2021; Psychological evaluation dated 10/07/2020; Speech/Language Evaluation dated 09/30/2020) -	Wechsler Intelligence Scale for Children - 5th Edition (WISC-V) -	Wechsler Individual Achievement Test - 4th Edition (WIAT-IV) -	Nikki Developmental Test of Visual-Motor Integration - 6th Edition (VMI) -	Humphreys Grooved Pegboard Test -	Kiddie Maria Luisa' Continuous Performance Test - 2nd Edition (K-CPT-2) -	NEPSY Second Edition:  A Developmental Neuropsychological Assessment (NEPSY-II) -	Behavior Assessment System for Children - 3rd Edition (BASC-3) - Parent -	Maria Luisa Parent Rating Scale - 3rd Edition (Maria Luisa 3) -	Williams Adaptive Behavior Scales - 3rd edition (VABS-3) - Comprehensive  -	Autism Diagnostic Observation Schedule - 2nd Edition (ADOS-2), Module 3 -	Social Communication Questionnaire (SCQ) -	Social Responsiveness Scale, 2nd Edition (SRS-2)   SCORES This score sheet is provided for the convenience of other professionals.  It accompanies a written report and should not be interpreted without reference to the report.  Scores are reported as follows:  Score Label	T-Score (T)	Standard Score (SS)	Z-Score  (Z) 	Scaled Score (Scs)	Percentile Rank Exceptionally Low	< 30	< 70	 < -2.0	< 4	< 2 Below Average	30-36	70-79	-2.0 to -1.4	4-5	2-8 Low Average	37-43	80-89	-1.3 to -0.7	6-7	9-24 Average	44-56		0.6 to -0.6	8-11	25-74 High Average	57-63	110-119	0.7-1.3	12-14	75-90 Above Average	64-69	120-129	1.4-1.9	15	91-97 Exceptionally High	> 70	> 130	> 2.0	 > 16	> 98 AE = age equivalent (years:months)			  Neuropsychological Test	Standardized Score	Percentile INTELLECTUAL FUNCTIONING	Standardized Score	Percentile WISC-V	 Block Design	Scs = 	5	5th  Similarities	Scs =	13	84th  Matrix Reasoning	Scs =	9	37th  Digit Span	Scs =	10	50th  Digit Span Forward	Scs =	10	50th  Digit Span Backward	Scs =	8	25th  Digit Span Sequencing	Scs =	11	63rd  Coding	Scs =	4	2nd  Vocabulary	Scs =	7	16th  Figure Weights	Scs =	12	75th  Verbal Comprehension (VCI)	SS = 	100	50th  Fluid Reasoning (FRI)	SS =	103	58th  Full-Scale (FSIQ)	SS =	89	23rd  VISUAL MOTOR	Standardized Score	Percentile Grooved Pegboard	 Dominant Hand (Right)	Z = 	-2.56	1st Dominant Hand Drops		4	-- Non-Dominant Hand (Left)	Z =	-0.14	44th  Non-Dominant Hand Drops		1	-- Beery VMI	 VMI	SS = 	89	23rd  ATTENTION AND EXECUTIVE FUNCTIONING	Standardized Score	Percentile Maria Luisa 3 - Parent (higher scores indicate more impairment)	 Inattention	T = 	66	95th  Hyperactivity	T = 	52	58th  Learning Problems	T = 	51	54th  Executive Functioning	T = 	41	18th  [place]/Aggression	T = 	42	21st  Peer Relations	T = 	53	62nd  DSM-5 Inattentiveness	T = 	52	58th  DSM-5 Hyper/Impulsivity	T = 	56	73rd  DSM-5 Conduct Disorder	T = 	44	27th  DSM-5 ODD	T = 	41	18th  ADHD Index		41%	-- Maria Luisa K-CPT-2 (higher scores indicate more impairment)	 d'	T = 	69	97th  Omissions	T =	61	86th  Commissions	T =	69	97th  Perseverations	T =	90	>99th  Hit Reaction Time	T =	59	82nd  HRT SD	T =	90	>99th  Variability	T =	66	95th  Block Change	T =	50	50th  MARY Change	T =	84	>99th  ACADEMIC ACHIEVEMENT	Standardized Score	Percentile WIAT-IV	 Word Reading	SS = 	130	98th  Pseudoword Decoding	SS = 	120	91st  Spelling	SS = 	127	96th  Numerical Operations	SS = 	121	92nd  SOCIAL / EMOTIONAL / BEHAVIORAL / ADAPTIVE	Standardized Score	Percentile BASC-3 - Parent	 Hyperactivity	T = 	43	24th  Aggression	T = 	42	21st  Conduct Problems	T = 	36	8th  Anxiety	T = 	36	8th  Depression	T = 	39	14th  Somatization	T = 	38	12th  Attention Problems	T = 	54	66th  Atypicality	T = 	57	76th  Withdrawal	T = 	50	50th  Adaptability	T = 	52	58th  Social Skills	T = 	42	21st  Leadership	T = 	34	5th  Activities of Daily Living	T = 	34	5th  Functional Communication	T = 	43	24th  Externalizing Problems	T = 	39	14th  Internalizing Problems	T = 	34	5th  Adaptive Skills	T = 	40	16th  Behavioral Symptoms Index	T = 	47	38th  Williams-3 (v-scale scores have mean = 15, sd = 3)	 Communication	SS = 	80	9th  Receptive	v-scale = 	11	9th  Expressive	v-scale =	10	5th  Written	v-scale =	14	37th  Daily Living Skills	SS = 	78	7th  Personal	v-scale = 	10	5th  Domestic	v-scale = 	10	5th  Community	v-scale = 	10	5th  Socialization	SS = 	79	8th  Interpersonal Relationships	v-scale = 	11	9th  Play and Leisure	v-scale = 	11	9th  Coping Skills	v-scale = 	11	9th  Motor Skills	SS = 	76	5th  Gross Motor	v-scale = 	10	5th  Fine Motor	v-scale = 	12	16th  Adaptive Behavior Composite	SS = 	77	6th  NEPSY-II 	 Affect Recognition	Scs = 	8	25th  SCQ Lifetime 	Raw = 	11	Below cut-off SRS-2	 Total Score	T = 	71	98th  Social Communication Index	T =	68	96th  Social Awareness	T =	64	92nd  Social Cognition	T =	66	95th  Social Communication	T =	72	99th  Social Motivation	T =	56	73rd  RRB	T =	78	>99th  ADOS-2	 Social Affect	Raw = 	11	-- Restricted and Repetitive Behavior	Raw =	4	-- Overall Total	Raw =	15	-- ADOS-2 Classification		Autism  ADOS-2 Comparison Score	Raw =	9	-- Level of Autism Spectrum Related Symptoms		High   normal...

## 2024-11-22 NOTE — ADDENDUM
[FreeTextEntry1] : APPENDIX OF SCORES AND PROCEDURES PROCEDURES ADMINISTERED -	Clinical Interview with patient and his mother -	Review of available medical and educational records ([facility] electronic medical record, IEP dated 05/31/2021; Psychological evaluation dated 10/07/2020; Speech/Language Evaluation dated 09/30/2020) -	Wechsler Intelligence Scale for Children - 5th Edition (WISC-V) -	Wechsler Individual Achievement Test - 4th Edition (WIAT-IV) -	Nikki Developmental Test of Visual-Motor Integration - 6th Edition (VMI) -	[place] Grooved Pegboard Test -	Kiddie Maria Luisa' Continuous Performance Test - 2nd Edition (K-CPT-2) -	NEPSY Second Edition:  A Developmental Neuropsychological Assessment (NEPSY-II) -	Behavior Assessment System for Children - 3rd Edition (BASC-3) - Parent -	Maria Luisa Parent Rating Scale - 3rd Edition (Maria Luisa 3) -	[place] Adaptive Behavior Scales - 3rd edition (VABS-3) - Comprehensive  -	Autism Diagnostic Observation Schedule - 2nd Edition (ADOS-2), Module 3 -	Social Communication Questionnaire (SCQ) -	Social Responsiveness Scale, 2nd Edition (SRS-2)   SCORES This score sheet is provided for the convenience of other professionals.  It accompanies a written report and should not be interpreted without reference to the report.  Scores are reported as follows:  Score Label	T-Score (T)	Standard Score (SS)	Z-Score  (Z) 	Scaled Score (Scs)	Percentile Rank Exceptionally Low	< 30	< 70	 < -2.0	< 4	< 2 Below Average	30-36	70-79	-2.0 to -1.4	4-5	2-8 Low Average	37-43	80-89	-1.3 to -0.7	6-7	9-24 Average	44-56		0.6 to -0.6	8-11	25-74 High Average	57-63	110-119	0.7-1.3	12-14	75-90 Above Average	64-69	120-129	1.4-1.9	15	91-97 Exceptionally High	> 70	> 130	> 2.0	 > 16	> 98 AE = age equivalent (years:months)			  Neuropsychological Test	Standardized Score	Percentile INTELLECTUAL FUNCTIONING	Standardized Score	Percentile WISC-V	 Block Design	Scs = 	5	5th  Similarities	Scs =	13	84th  Matrix Reasoning	Scs =	9	37th  Digit Span	Scs =	10	50th  Digit Span Forward	Scs =	10	50th  Digit Span Backward	Scs =	8	25th  Digit Span Sequencing	Scs =	11	63rd  Coding	Scs =	4	2nd  Vocabulary	Scs =	7	16th  Figure Weights	Scs =	12	75th  Verbal Comprehension (VCI)	SS = 	100	50th  Fluid Reasoning (FRI)	SS =	103	58th  Full-Scale (FSIQ)	SS =	89	23rd  VISUAL MOTOR	Standardized Score	Percentile Grooved Pegboard	 Dominant Hand (Right)	Z = 	-2.56	1st Dominant Hand Drops		4	-- Non-Dominant Hand (Left)	Z =	-0.14	44th  Non-Dominant Hand Drops		1	-- Beery VMI	 VMI	SS = 	89	23rd  ATTENTION AND EXECUTIVE FUNCTIONING	Standardized Score	Percentile Maria Luisa 3 - Parent (higher scores indicate more impairment)	 Inattention	T = 	66	95th  Hyperactivity	T = 	52	58th  Learning Problems	T = 	51	54th  Executive Functioning	T = 	41	18th  [place]/Aggression	T = 	42	21st  Peer Relations	T = 	53	62nd  DSM-5 Inattentiveness	T = 	52	58th  DSM-5 Hyper/Impulsivity	T = 	56	73rd  DSM-5 Conduct Disorder	T = 	44	27th  DSM-5 ODD	T = 	41	18th  ADHD Index		41%	-- Maria Luisa K-CPT-2 (higher scores indicate more impairment)	 d'	T = 	69	97th  Omissions	T =	61	86th  Commissions	T =	69	97th  Perseverations	T =	90	>99th  Hit Reaction Time	T =	59	82nd  HRT SD	T =	90	>99th  Variability	T =	66	95th  Block Change	T =	50	50th  MARY Change	T =	84	>99th  ACADEMIC ACHIEVEMENT	Standardized Score	Percentile WIAT-IV	 Word Reading	SS = 	130	98th  Pseudoword Decoding	SS = 	120	91st  Spelling	SS = 	127	96th  Numerical Operations	SS = 	121	92nd  SOCIAL / EMOTIONAL / BEHAVIORAL / ADAPTIVE	Standardized Score	Percentile BASC-3 - Parent	 Hyperactivity	T = 	43	24th  Aggression	T = 	42	21st  Conduct Problems	T = 	36	8th  Anxiety	T = 	36	8th  Depression	T = 	39	14th  Somatization	T = 	38	12th  Attention Problems	T = 	54	66th  Atypicality	T = 	57	76th  Withdrawal	T = 	50	50th  Adaptability	T = 	52	58th  Social Skills	T = 	42	21st  Leadership	T = 	34	5th  Activities of Daily Living	T = 	34	5th  Functional Communication	T = 	43	24th  Externalizing Problems	T = 	39	14th  Internalizing Problems	T = 	34	5th  Adaptive Skills	T = 	40	16th  Behavioral Symptoms Index	T = 	47	38th  [place]-3 (v-scale scores have mean = 15, sd = 3)	 Communication	SS = 	80	9th  Receptive	v-scale = 	11	9th  Expressive	v-scale =	10	5th  Written	v-scale =	14	37th  Daily Living Skills	SS = 	78	7th  Personal	v-scale = 	10	5th  Domestic	v-scale = 	10	5th  Community	v-scale = 	10	5th  Socialization	SS = 	79	8th  Interpersonal Relationships	v-scale = 	11	9th  Play and Leisure	v-scale = 	11	9th  Coping Skills	v-scale = 	11	9th  Motor Skills	SS = 	76	5th  Gross Motor	v-scale = 	10	5th  Fine Motor	v-scale = 	12	16th  Adaptive Behavior Composite	SS = 	77	6th  NEPSY-II 	 Affect Recognition	Scs = 	8	25th  SCQ Lifetime 	Raw = 	11	Below cut-off SRS-2	 Total Score	T = 	71	98th  Social Communication Index	T =	68	96th  Social Awareness	T =	64	92nd  Social Cognition	T =	66	95th  Social Communication	T =	72	99th  Social Motivation	T =	56	73rd  RRB	T =	78	>99th  ADOS-2	 Social Affect	Raw = 	11	-- Restricted and Repetitive Behavior	Raw =	4	-- Overall Total	Raw =	15	-- ADOS-2 Classification		Autism  ADOS-2 Comparison Score	Raw =	9	-- Level of Autism Spectrum Related Symptoms		High

## 2025-07-02 NOTE — REASON FOR VISIT
[Follow-Up Visit] : a follow-up visit for [Autism Spectrum Disorder] : autism spectrum disorder [Speech/Language] : speech/language problems [Patient] : patient [Mother] : mother [FreeTextEntry2] : Chintan is doing well. Toilet training has improved and he no longer needs a timer. Language improved , Chintan is using sentences and expressing hs wants and feelings.

## 2025-07-02 NOTE — PHYSICAL EXAM
[Normal] : patient has a normal gait [Attention Intact] : attention intact [Well-behaved during visit] : well-behaved during visit [Appropriate eye contact] : appropriate eye contact [Smiles responsively] : smiles responsively [Answered questions appropriately] : answered questions appropriately [Responds to name] : responds to name [Able to follow one step commands] : able to follow one step commands [Joint attention noted] : joint attention noted [Social referencing noted] : social referencing noted [Fidgets] : does not fidget [Echolalia] : no echolalia [de-identified] : Observation in CHRIS  therapy Good pencil grasp, drawing, conversation, self expression, back and forth conversation. Tab showed the birthday card he made for his teacher and read from the words he wrote Good writing and letter /word formation. Mental math simple one stage addition and subtraction word problems well done.

## 2025-07-02 NOTE — PLAN
[Careful Teacher Selection] : - Next year's teacher(s) should be carefully selected to ensure a favorable fit [Continue IEP] : - Continue services as presently provided for in the Individualized Education Program [Self-Contained Special Education (Qualified)] : - Placement in a self-contained special education classroom in which teachers have expertise in teaching children with autism is recommended. However, child should be grouped with verbal children who demonstrate interest in communicating, and who do not have serious disruptive behavior problems [Instruction in Executive Function Skills] : - Direct, individualized instruction in executive function-related skills: i.e. task analysis, planning, organization, study strategies, memorization [Monitor Attention] : - [unfilled]'s attention skills will need to continue to be monitored [Intensive Reading Instruction] : - Intensive reading instruction [Speech/Language] : - Speech and language therapy  [Occupational Therapy] : - Occupational therapy [Physical Therapy] : - Physical therapy [Individual In-School Counseling] : - Individual counseling weekly with school psychologist or  [Social Skills] : - Social skills training [CHRIS] : - Applied Behavior Analysis (CHRIS) therapy [Genetics] : - Medical Geneticist [Fish Oil] : - Dietary supplementation with fish oil as a source of omega-3 fatty acids - guidelines and cautions discussed [Follow-up visit (re-evaluation): _____] : - Follow-up visit in [unfilled]  for re-evaluation. [Accuracy] : Accuracy and reliability of clinical impressions [Findings (To Date)] : Findings from evaluation (to date) [Clinical Basis] : Clinical basis for current diagnosis and clinical impressions [Dev. Therapies: ____] : Benefits and limits of developmental therapies: [unfilled] [CAM Therapies] : Benefits and limits of CAM therapies [Counseling] : Benefits and limits of counseling or therapy [Behavior Modification] : Behavior modification strategies [Family Questions] : Family's questions were addressed [Diet] : Evidence-based clinical information about diet [Sleep] : The importance of sleep and strategies to ensure adequate sleep [Media / Screen Time] : Importance of limiting electronics, media, and screen time [Complete Blood Count] : - Complete blood count [Thyroid Function Testing] : - Thyroid fuction testing [Lead Level] : - Lead level [Basic Metabolic Panel] : - Basic metabolic panel [Liver Function Test] : - Liver function test [Lipid Profile] : - Lipid profile [Other: _____] : - [unfilled] [FreeTextEntry8] : saffron, magnesium

## 2025-07-02 NOTE — HISTORY OF PRESENT ILLNESS
[Just Completed?] : just completed [SC: _____] : self-contained [unfilled] [IEP] : Individualized Education Program [OT: ____] : Occupational Therapy [unfilled] [PT:____] : Physical Therapy [unfilled] [CHRIS: _____] : Applied behavior analysis [unfilled] [S-L: _____] : Speech/Language Therapy [unfilled] [Counseling: _____] : Counseling [unfilled] [No Major Concerns] : No major concerns [TWNoteComboBox1] : 2nd Grade [de-identified] : improved [de-identified] : stable [de-identified] : some concerns [de-identified] : concerns, does better with adults [Major Illness] : no major illness [Major Injury] : no major injury [Surgery] : no surgery [Hospitalizations] : no hospitalizations [New Medications] : no new medication [New Allergies] : no new allergies